# Patient Record
Sex: MALE | Race: WHITE | NOT HISPANIC OR LATINO | URBAN - METROPOLITAN AREA
[De-identification: names, ages, dates, MRNs, and addresses within clinical notes are randomized per-mention and may not be internally consistent; named-entity substitution may affect disease eponyms.]

---

## 2017-04-25 ENCOUNTER — APPOINTMENT (OUTPATIENT)
Age: 37
Setting detail: DERMATOLOGY
End: 2017-04-25

## 2017-04-25 DIAGNOSIS — L57.8 OTHER SKIN CHANGES DUE TO CHRONIC EXPOSURE TO NONIONIZING RADIATION: ICD-10-CM

## 2017-04-25 DIAGNOSIS — L71.8 OTHER ROSACEA: ICD-10-CM

## 2017-04-25 DIAGNOSIS — L30.9 DERMATITIS, UNSPECIFIED: ICD-10-CM

## 2017-04-25 PROBLEM — F32.9 MAJOR DEPRESSIVE DISORDER, SINGLE EPISODE, UNSPECIFIED: Status: ACTIVE | Noted: 2017-04-25

## 2017-04-25 PROBLEM — L29.8 OTHER PRURITUS: Status: ACTIVE | Noted: 2017-04-25

## 2017-04-25 PROBLEM — L20.84 INTRINSIC (ALLERGIC) ECZEMA: Status: ACTIVE | Noted: 2017-04-25

## 2017-04-25 PROBLEM — L70.0 ACNE VULGARIS: Status: ACTIVE | Noted: 2017-04-25

## 2017-04-25 PROCEDURE — 99203 OFFICE O/P NEW LOW 30 MIN: CPT

## 2017-04-25 PROCEDURE — OTHER TREATMENT REGIMEN: OTHER

## 2017-04-25 PROCEDURE — OTHER MIPS QUALITY: OTHER

## 2017-04-25 PROCEDURE — OTHER PRESCRIPTION: OTHER

## 2017-04-25 RX ORDER — METRONIDAZOLE 10 MG/G
THIN COAT GEL TOPICAL QHS
Qty: 1 | Refills: 2 | Status: ERX | COMMUNITY
Start: 2017-04-25

## 2017-04-25 ASSESSMENT — PAIN INTENSITY VAS: HOW INTENSE IS YOUR PAIN 0 BEING NO PAIN, 10 BEING THE MOST SEVERE PAIN POSSIBLE?: NO PAIN

## 2017-04-25 ASSESSMENT — LOCATION SIMPLE DESCRIPTION DERM
LOCATION SIMPLE: SCALP
LOCATION SIMPLE: LEFT EAR
LOCATION SIMPLE: PENIS
LOCATION SIMPLE: LEFT CHEEK

## 2017-04-25 ASSESSMENT — LOCATION ZONE DERM
LOCATION ZONE: PENIS
LOCATION ZONE: FACE
LOCATION ZONE: EAR
LOCATION ZONE: SCALP

## 2017-04-25 ASSESSMENT — LOCATION DETAILED DESCRIPTION DERM
LOCATION DETAILED: LEFT SUPERIOR PARIETAL SCALP
LOCATION DETAILED: LEFT CRUS OF HELIX
LOCATION DETAILED: LEFT CENTRAL MALAR CHEEK
LOCATION DETAILED: LEFT DORSAL SHAFT OF PENIS
LOCATION DETAILED: LEFT SUPERIOR MEDIAL BUCCAL CHEEK

## 2017-04-25 ASSESSMENT — SEVERITY ASSESSMENT: SEVERITY: MILD

## 2017-04-25 ASSESSMENT — SEVERITY ASSESSMENT OVERALL AMONG ALL PATIENTS
IN YOUR EXPERIENCE, AMONG ALL PATIENTS YOU HAVE SEEN WITH THIS CONDITION, HOW SEVERE IS THIS PATIENT'S CONDITION?: MINIMAL

## 2017-04-25 NOTE — HPI: RASH (ROSACEA)
How Severe Is Your Rosacea?: mild
Is This A New Presentation, Or A Follow-Up?: Rash
Additional History: Patient has had rosacea for several years, well-controlled with Finacea but he is looking for a different treatment option that may be less expensive. He has also several other skin issues to discuss today

## 2017-04-25 NOTE — PROCEDURE: TREATMENT REGIMEN
Detail Level: Generalized
Detail Level: Simple
Plan: Patient is interested in trying something different than Finacea due to cost. Prescription for metronidazole sent in however, patient will call if there are any problems regarding cost. We may also consider Soolantra as it has a coupon program
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 I see no evidence of infection, either fungal or bacterial, no evidence of herpetic infection. If no improvement with the topical steroid we have several options: observe, change treatment to Eucrisa or consider a biopsy for further delineation of the problem
Otc Regimen: Nizoral 1% shampoo as directed three times weekly. If no improvement to the site in the left ear try the Topicort sample
Plan: Full body skin exam recommended, patient will consider and schedule at a later date
Detail Level: Zone
Plan: Regarding the dermatitis on the scalp it seems to be consistent with a seborrheic dermatitis so the ketoconazole shampoo should work well. However the lesion in the left ear could be fungal, eczematous, or something related to actinic change so if no improvement either with the topical antifungal or the topical steroids we will need to consider a biopsy
Detail Level: Detailed
Samples Given: Topicort 0.05% appointment twice daily for 5 to 7 days, if no improvement stop the medication and call me

## 2017-06-06 ENCOUNTER — APPOINTMENT (OUTPATIENT)
Age: 37
Setting detail: DERMATOLOGY
End: 2017-06-08

## 2017-06-06 DIAGNOSIS — L30.9 DERMATITIS, UNSPECIFIED: ICD-10-CM

## 2017-06-06 PROCEDURE — OTHER TREATMENT REGIMEN: OTHER

## 2017-06-06 PROCEDURE — 99213 OFFICE O/P EST LOW 20 MIN: CPT

## 2017-06-06 PROCEDURE — OTHER PRESCRIPTION: OTHER

## 2017-06-06 PROCEDURE — OTHER FOLLOW UP FOR NEXT VISIT: OTHER

## 2017-06-06 RX ORDER — TERBINAFINE HCL 250 MG
250 MG TABLET ORAL DAILY
Qty: 14 | Refills: 1 | Status: ERX | COMMUNITY
Start: 2017-06-06 | End: 2017-07-06

## 2017-06-06 ASSESSMENT — LOCATION DETAILED DESCRIPTION DERM
LOCATION DETAILED: RIGHT CENTRAL FRONTAL SCALP
LOCATION DETAILED: LEFT CRUS OF HELIX
LOCATION DETAILED: LEFT CENTRAL FRONTAL SCALP
LOCATION DETAILED: RIGHT DORSAL SHAFT OF PENIS
LOCATION DETAILED: RIGHT CRUS OF HELIX

## 2017-06-06 ASSESSMENT — LOCATION SIMPLE DESCRIPTION DERM
LOCATION SIMPLE: LEFT SCALP
LOCATION SIMPLE: RIGHT EAR
LOCATION SIMPLE: PENIS
LOCATION SIMPLE: LEFT EAR
LOCATION SIMPLE: RIGHT SCALP

## 2017-06-06 ASSESSMENT — LOCATION ZONE DERM
LOCATION ZONE: EAR
LOCATION ZONE: PENIS
LOCATION ZONE: SCALP

## 2017-06-06 ASSESSMENT — PAIN INTENSITY VAS: HOW INTENSE IS YOUR PAIN 0 BEING NO PAIN, 10 BEING THE MOST SEVERE PAIN POSSIBLE?: NO PAIN

## 2017-06-06 ASSESSMENT — SEVERITY ASSESSMENT: SEVERITY: MILD TO MODERATE

## 2017-06-06 NOTE — PROCEDURE: TREATMENT REGIMEN
Detail Level: Simple
Plan: There is a possibility this is all related to some atypical form of a fungal infection so we decided to start an oral antifungal. Patient will call if no improvement in the next several weeks so that we can discuss different treatment options. If there is any change patient will call or follow up. This may also represent some atypical form of psoriasis, if no improvement we may need to consider a biopsy for further assistance regarding treatment. Patient may also need to see an ear nose and throat physician regarding the scaling and dermatitis in the ear

## 2017-10-04 ENCOUNTER — RX ONLY (RX ONLY)
Age: 37
End: 2017-10-04

## 2017-10-04 RX ORDER — DESOXIMETASONE 0.5 MG/G
THIN COAT CREAM TOPICAL BID
Qty: 1 | Refills: 1 | Status: ERX | COMMUNITY
Start: 2017-10-04

## 2017-10-28 ENCOUNTER — RX ONLY (RX ONLY)
Age: 37
End: 2017-10-28

## 2017-10-28 RX ORDER — DESOXIMETASONE 0.5 MG/G
THIN COAT CREAM TOPICAL BID
Qty: 1 | Refills: 1 | Status: ERX

## 2017-11-30 ENCOUNTER — APPOINTMENT (OUTPATIENT)
Age: 37
Setting detail: DERMATOLOGY
End: 2017-12-01

## 2017-11-30 DIAGNOSIS — L72.8 OTHER FOLLICULAR CYSTS OF THE SKIN AND SUBCUTANEOUS TISSUE: ICD-10-CM

## 2017-11-30 DIAGNOSIS — L30.9 DERMATITIS, UNSPECIFIED: ICD-10-CM

## 2017-11-30 PROBLEM — J30.1 ALLERGIC RHINITIS DUE TO POLLEN: Status: ACTIVE | Noted: 2017-11-30

## 2017-11-30 PROBLEM — F41.9 ANXIETY DISORDER, UNSPECIFIED: Status: ACTIVE | Noted: 2017-11-30

## 2017-11-30 PROBLEM — J45.909 UNSPECIFIED ASTHMA, UNCOMPLICATED: Status: ACTIVE | Noted: 2017-11-30

## 2017-11-30 PROBLEM — L85.3 XEROSIS CUTIS: Status: ACTIVE | Noted: 2017-11-30

## 2017-11-30 PROCEDURE — 10060 I&D ABSCESS SIMPLE/SINGLE: CPT

## 2017-11-30 PROCEDURE — OTHER FOLLOW UP FOR NEXT VISIT: OTHER

## 2017-11-30 PROCEDURE — OTHER COUNSELING: OTHER

## 2017-11-30 PROCEDURE — OTHER TREATMENT REGIMEN: OTHER

## 2017-11-30 PROCEDURE — 99213 OFFICE O/P EST LOW 20 MIN: CPT | Mod: 25

## 2017-11-30 PROCEDURE — OTHER INCISION AND DRAINAGE: OTHER

## 2017-11-30 ASSESSMENT — LOCATION DETAILED DESCRIPTION DERM
LOCATION DETAILED: RIGHT CENTRAL FRONTAL SCALP
LOCATION DETAILED: LEFT CENTRAL FRONTAL SCALP
LOCATION DETAILED: RIGHT CRUS OF HELIX
LOCATION DETAILED: RIGHT DORSAL SHAFT OF PENIS
LOCATION DETAILED: EPIGASTRIC SKIN
LOCATION DETAILED: LEFT CRUS OF HELIX

## 2017-11-30 ASSESSMENT — LOCATION ZONE DERM
LOCATION ZONE: EAR
LOCATION ZONE: TRUNK
LOCATION ZONE: PENIS
LOCATION ZONE: SCALP

## 2017-11-30 ASSESSMENT — BSA RASH: BSA RASH: 1

## 2017-11-30 ASSESSMENT — LOCATION SIMPLE DESCRIPTION DERM
LOCATION SIMPLE: PENIS
LOCATION SIMPLE: RIGHT SCALP
LOCATION SIMPLE: RIGHT EAR
LOCATION SIMPLE: LEFT SCALP
LOCATION SIMPLE: ABDOMEN
LOCATION SIMPLE: LEFT EAR

## 2017-11-30 ASSESSMENT — PAIN INTENSITY VAS: HOW INTENSE IS YOUR PAIN 0 BEING NO PAIN, 10 BEING THE MOST SEVERE PAIN POSSIBLE?: NO PAIN

## 2017-11-30 ASSESSMENT — SEVERITY ASSESSMENT: SEVERITY: MILD

## 2017-11-30 NOTE — PROCEDURE: TREATMENT REGIMEN
Plan: The oral antifungal did not seem to make much difference but the topical steroid has helped quite a bit. He is able to continue to use this periodically and episodically but he is aware of the fact that he does not need to use this continuously. In light of that I did give him a sample of Eucrisa to try once or twice daily and if this works you can call for a prescription. I also gave him a sample of Oxistat Lotion to use in and around the ears, if this helps he will call for a prescription. If not we can consider other non-steroid based topical medications like Elidel. He will continue to use the T gel on the scalp and he can also try this in and around the ears. This appears to be some form of perivascular dermatitis, possible seborrheic dermatitis or even an atypical presentation of psoriasis. We will continue to try various topical treatment options to see what will offer the best for long-term control
Plan: Only a small amount of sebaceous contents were expressed, I wasn’t able to remove the capsule. Patient will observe for the next 1 to 2 weeks and if any problems he will call. I’m hoping that this simply resolves and once it is back to its smallest stay patient and follow up for excisional removal. He will call if there is any inflammation for possible prescription for antibiotics but I see no need for an antibiotic at this time
Detail Level: Detailed
Detail Level: Simple

## 2017-11-30 NOTE — HPI: CYST
How Severe Is Your Cyst?: moderate
Is This A New Presentation, Or A Follow-Up?: Cyst
Additional History: Patient has had cysts in the past, this lesion on the anterior chest has been there for several weeks, he was able to express a lot of contents two weeks ago and has been able to express some contents over the last two weeks. He’s here for evaluation and possible treatment

## 2017-11-30 NOTE — PROCEDURE: INCISION AND DRAINAGE
Size Of Lesion In Cm (Optional But May Be Required For Some Insurances): 1.2
Dressing: dry sterile dressing
Suture Text: The incision was partially closed with
Wound Care: Polysporin ointment
Epidermal Closure: simple interrupted
Curette: No
Epidermal Sutures: 4-0 Ethilon
Consent was obtained and risks were reviewed including but not limited to delayed wound healing, infection, need for multiple I and D's, and pain.
Curette Text (Optional): After the contents were expressed a curette was used to partially remove the cyst wall.
Anesthesia Volume In Cc: 0.6
Anesthesia Type: 1% Xylocaine with epinephrine
Method: 15 blade
Drainage Amount?: minimal
Lesion Type: Cyst
Detail Level: Detailed
Post-Care Instructions: I reviewed with the patient in detail post-care instructions. Patient should keep wound covered and call the office should any redness, pain, swelling or worsening occur.
Drainage Type?: keratinaceous
Render Postcare In Note?: Yes
Preparation Text: The area was prepped in the usual clean fashion.

## 2018-02-13 ENCOUNTER — RX ONLY (RX ONLY)
Age: 38
End: 2018-02-13

## 2018-02-13 RX ORDER — CRISABOROLE 20 MG/G
THIN COAT OINTMENT TOPICAL BID
Qty: 1 | Refills: 2 | Status: ERX | COMMUNITY
Start: 2018-02-13

## 2018-02-13 RX ORDER — AZELAIC ACID 0.15 G/G
THIN COAT GEL TOPICAL DAILY
Qty: 1 | Refills: 2 | Status: ERX

## 2018-03-05 ENCOUNTER — RX ONLY (RX ONLY)
Age: 38
End: 2018-03-05

## 2018-03-05 RX ORDER — CRISABOROLE 20 MG/G
THIN COAT OINTMENT TOPICAL BID
Qty: 1 | Refills: 2 | Status: ERX